# Patient Record
Sex: MALE | Race: BLACK OR AFRICAN AMERICAN | NOT HISPANIC OR LATINO | Employment: UNEMPLOYED | URBAN - METROPOLITAN AREA
[De-identification: names, ages, dates, MRNs, and addresses within clinical notes are randomized per-mention and may not be internally consistent; named-entity substitution may affect disease eponyms.]

---

## 2023-12-18 ENCOUNTER — HOSPITAL ENCOUNTER (EMERGENCY)
Facility: HOSPITAL | Age: 43
Discharge: HOME | End: 2023-12-18
Payer: MEDICAID

## 2023-12-18 VITALS
DIASTOLIC BLOOD PRESSURE: 75 MMHG | WEIGHT: 185 LBS | OXYGEN SATURATION: 100 % | SYSTOLIC BLOOD PRESSURE: 129 MMHG | RESPIRATION RATE: 16 BRPM | BODY MASS INDEX: 25.9 KG/M2 | HEIGHT: 71 IN | HEART RATE: 69 BPM | TEMPERATURE: 96.4 F

## 2023-12-18 DIAGNOSIS — Z71.1 CONCERN ABOUT STD IN MALE WITHOUT DIAGNOSIS: Primary | ICD-10-CM

## 2023-12-18 PROCEDURE — 2500000004 HC RX 250 GENERAL PHARMACY W/ HCPCS (ALT 636 FOR OP/ED): Performed by: PHYSICIAN ASSISTANT

## 2023-12-18 PROCEDURE — 87661 TRICHOMONAS VAGINALIS AMPLIF: CPT | Mod: 59 | Performed by: PHYSICIAN ASSISTANT

## 2023-12-18 PROCEDURE — 99284 EMERGENCY DEPT VISIT MOD MDM: CPT | Performed by: PHYSICIAN ASSISTANT

## 2023-12-18 PROCEDURE — 99283 EMERGENCY DEPT VISIT LOW MDM: CPT | Mod: 25

## 2023-12-18 PROCEDURE — 99284 EMERGENCY DEPT VISIT MOD MDM: CPT

## 2023-12-18 PROCEDURE — 87800 DETECT AGNT MULT DNA DIREC: CPT | Performed by: PHYSICIAN ASSISTANT

## 2023-12-18 PROCEDURE — 96372 THER/PROPH/DIAG INJ SC/IM: CPT

## 2023-12-18 PROCEDURE — 2500000001 HC RX 250 WO HCPCS SELF ADMINISTERED DRUGS (ALT 637 FOR MEDICARE OP): Performed by: PHYSICIAN ASSISTANT

## 2023-12-18 RX ORDER — METRONIDAZOLE 500 MG/1
2000 TABLET ORAL ONCE
Status: COMPLETED | OUTPATIENT
Start: 2023-12-18 | End: 2023-12-18

## 2023-12-18 RX ORDER — DOXYCYCLINE 100 MG/1
100 TABLET ORAL 2 TIMES DAILY
Qty: 14 TABLET | Refills: 0 | Status: SHIPPED | OUTPATIENT
Start: 2023-12-18 | End: 2023-12-25

## 2023-12-18 RX ORDER — DOXYCYCLINE HYCLATE 100 MG
100 TABLET ORAL ONCE
Status: COMPLETED | OUTPATIENT
Start: 2023-12-18 | End: 2023-12-18

## 2023-12-18 RX ORDER — CEFTRIAXONE 500 MG/1
500 INJECTION, POWDER, FOR SOLUTION INTRAMUSCULAR; INTRAVENOUS ONCE
Status: COMPLETED | OUTPATIENT
Start: 2023-12-18 | End: 2023-12-18

## 2023-12-18 RX ADMIN — CEFTRIAXONE SODIUM 500 MG: 500 INJECTION, POWDER, FOR SOLUTION INTRAMUSCULAR; INTRAVENOUS at 13:47

## 2023-12-18 RX ADMIN — METRONIDAZOLE 2000 MG: 500 TABLET ORAL at 13:47

## 2023-12-18 RX ADMIN — DOXYCYCLINE HYCLATE 100 MG: 100 TABLET, COATED ORAL at 13:47

## 2023-12-18 ASSESSMENT — LIFESTYLE VARIABLES
EVER HAD A DRINK FIRST THING IN THE MORNING TO STEADY YOUR NERVES TO GET RID OF A HANGOVER: NO
HAVE PEOPLE ANNOYED YOU BY CRITICIZING YOUR DRINKING: NO
EVER FELT BAD OR GUILTY ABOUT YOUR DRINKING: NO
REASON UNABLE TO ASSESS: NO
HAVE YOU EVER FELT YOU SHOULD CUT DOWN ON YOUR DRINKING: NO

## 2023-12-18 ASSESSMENT — COLUMBIA-SUICIDE SEVERITY RATING SCALE - C-SSRS
1. IN THE PAST MONTH, HAVE YOU WISHED YOU WERE DEAD OR WISHED YOU COULD GO TO SLEEP AND NOT WAKE UP?: NO
6. HAVE YOU EVER DONE ANYTHING, STARTED TO DO ANYTHING, OR PREPARED TO DO ANYTHING TO END YOUR LIFE?: NO
2. HAVE YOU ACTUALLY HAD ANY THOUGHTS OF KILLING YOURSELF?: NO

## 2023-12-18 ASSESSMENT — PAIN - FUNCTIONAL ASSESSMENT: PAIN_FUNCTIONAL_ASSESSMENT: 0-10

## 2023-12-18 NOTE — DISCHARGE INSTRUCTIONS
Take the full course of the antibiotics.  You will be called for any positive results.  In that case you need to tell your partners to be tested and treated.  If you have persistent symptoms follow-up with your PCP, if you need a new 1 call the number listed below.

## 2023-12-18 NOTE — ED PROVIDER NOTES
HPI:  43-year-old male otherwise healthy presents with concerns for STDs.  States he has some penile discharge.  Is sexually active without protection.  Denies any lesions.  Denies any scrotal pain.  Denies any dysuria.  He would like to be treated empirically.  Does not want any blood testing.      Physical Exam:   GEN: Vitals noted. NAD  EYES:  EOMs grossly intact, anicteric sclera  ROSELINE: Mucosa moist.  NECK: Supple.  CARD: RRR  PULMONARY: Moving air well. Clear all lung fields.  ABDOMEN: Soft, no guarding, no rigidity. Nontender. NABS  EXTREMITIES: Full ROM, no pitting edema,   SKIN: Intact, warm and dry  NEURO: Alert and oriented x 3, speech is clear, no obvious deficits noted.   : Declined exam    ----------------------------------------------------------------------------------------------------------------------------    MDM:  43-year-old male presenting for concerns for STDs.  On exam he is well-appearing in bed comfortably.  He declined  exam which I feel is reasonable given lack of scrotal pain and lack of reported lesions.  Will treat him empirically per his request for GC/chlamydia and trichomonas.  First dose given in ED.  1 week of doxycycline provided.  Told to follow-up with PCP.  Tell his partner for any positive results that he will receive a phone call for.    No orders to display     Labs Reviewed   C. TRACHOMATIS + N. GONORRHOEAE, AMPLIFIED   TRICH VAGINALIS, AMPLIFIED     Diagnoses as of 12/18/23 1342   Concern about STD in male without diagnosis     ----------------------------------------------------------------------------------------------------------------------------    This note was dictated using a speech recognition program.  While an attempt was made at proof reading to minimize errors, minor errors in transcription may be present call for questions.     Evert Ma PA-C  12/18/23 [id number]

## 2023-12-19 LAB
C TRACH RRNA SPEC QL NAA+PROBE: POSITIVE
N GONORRHOEA DNA SPEC QL PROBE+SIG AMP: NEGATIVE
T VAGINALIS RRNA SPEC QL NAA+PROBE: NEGATIVE

## 2023-12-21 ENCOUNTER — TELEPHONE (OUTPATIENT)
Dept: PHARMACY | Facility: HOSPITAL | Age: 43
End: 2023-12-21
Payer: MEDICAID

## 2023-12-21 NOTE — PROGRESS NOTES
EDPD Note: Antibiotics Reviewed and Warranted    Contacted . Josh Marie regarding a positive chlamydia result that was taken during their recent emergency room visit. I completed education with patient . The patient is being treated appropriately with doxycyline 100 mg BID x 7 days.      Latest Reference Range & Units 12/18/23 13:38   Chlamydia trachomatis, Amplified Negative  Positive !   Neisseria gonorrhea,Amplified Negative  Negative   Trichomonas Vaginalis Negative, Invalid, TRICH neg  Negative   !: Data is abnormal     Patient presented to the ED with concern for STI. Given empiric treatment with metronidazole 2 g x 1 and ceftriaxone 500 mg IM x 1. Patient discharged with paper prescription for doxycyline. Patient will take to get filled today.     No further follow up needed from EDPD Team.     Aspen Juarez, PharmD

## 2024-07-22 ENCOUNTER — HOSPITAL ENCOUNTER (EMERGENCY)
Facility: HOSPITAL | Age: 44
Discharge: HOME | End: 2024-07-22
Attending: STUDENT IN AN ORGANIZED HEALTH CARE EDUCATION/TRAINING PROGRAM
Payer: MEDICAID

## 2024-07-22 VITALS
HEART RATE: 77 BPM | HEIGHT: 71 IN | RESPIRATION RATE: 15 BRPM | WEIGHT: 180 LBS | OXYGEN SATURATION: 98 % | BODY MASS INDEX: 25.2 KG/M2 | TEMPERATURE: 97.4 F | SYSTOLIC BLOOD PRESSURE: 128 MMHG | DIASTOLIC BLOOD PRESSURE: 77 MMHG

## 2024-07-22 DIAGNOSIS — K08.89 PAIN, DENTAL: Primary | ICD-10-CM

## 2024-07-22 PROCEDURE — 99283 EMERGENCY DEPT VISIT LOW MDM: CPT

## 2024-07-22 PROCEDURE — 99283 EMERGENCY DEPT VISIT LOW MDM: CPT | Performed by: STUDENT IN AN ORGANIZED HEALTH CARE EDUCATION/TRAINING PROGRAM

## 2024-07-22 RX ORDER — CHLORHEXIDINE GLUCONATE ORAL RINSE 1.2 MG/ML
15 SOLUTION DENTAL AS NEEDED
Qty: 120 ML | Refills: 0 | Status: SHIPPED | OUTPATIENT
Start: 2024-07-22 | End: 2024-08-05

## 2024-07-22 NOTE — DISCHARGE INSTRUCTIONS
You presented to the ED for tooth tingling.  You are noted to have visual plaque and poor dentition.  Follow-up with dentistry within the next 2 to 3 days.  Dental clinic phone number is [phone].  Please return to the emergency department for any new or worsening symptoms including but not limited to mild swelling and concerning pain.

## 2024-07-22 NOTE — ED TRIAGE NOTES
Gum sensitivity started 2-3 days ago, no difficulty swallowing, no bleeding to patients knowledge.

## 2024-07-22 NOTE — ED PROVIDER NOTES
"CC: Dental Pain     HPI:  Patient is a 44-year-old male no pertinent past medical history presents to the ED for dental discomfort.  Patient has noted dental discomfort around his gums over the past 2 days.  Notes dental discomfort around his front 4 teeth.  He notes that he has brushes teeth and use mouthwash without any improvement.  Patient notes that he brushes his teeth 2 times a day.  Patient notes that he has not seen a dentist in \"some time \".  He notes a little bit of blood from his gums.  Patient also notes that he does not floss.  Patient notes that he has had gum tingling sensation for that improved with Peridex wash.  Denied history of dental surgery.  Denied fevers, chills, difficulty breathing, trauma, falls, headache, chest pain, neck pain, back pain, and abdominal pain.    Limitations to history: None  Independent historian(s): Patient  Records Reviewed: Recent available ED and inpatient notes reviewed in EMR.    PMHx/PSHx:  Per HPI.   - has no past medical history on file.  - has no past surgical history on file.    Medications:  Reviewed in EMR. See EMR for complete list of medications and doses.    Allergies:  Patient has no known allergies.    Social History:  - Tobacco:  has no history on file for tobacco use.   - Alcohol:  has no history on file for alcohol use.   - Illicit Drugs:  has no history on file for drug use.     ROS:  Per HPI.       ???????????????????????????????????????????????????????????????  Triage Vitals:  T 36.5 °C (97.7 °F)  HR 98  /78  RR 16  O2 (!) 93 %      Physical Exam  Vitals and nursing note reviewed.   Constitutional:       General: He is not in acute distress.  HENT:      Head: Normocephalic and atraumatic.      Nose: Nose normal.      Mouth/Throat:      Mouth: Mucous membranes are moist.      Comments: Significant plaque buildup around teeth 23 through 26.  Poor dentition throughout.  Eyes:      Conjunctiva/sclera: Conjunctivae normal.   Cardiovascular:    "   Rate and Rhythm: Normal rate and regular rhythm.      Pulses: Normal pulses.   Pulmonary:      Effort: Pulmonary effort is normal. No respiratory distress.      Breath sounds: Normal breath sounds.   Abdominal:      Palpations: Abdomen is soft.      Tenderness: There is no abdominal tenderness.   Skin:     General: Skin is warm.   Neurological:      General: No focal deficit present.      Mental Status: He is alert.         ???????????????????????????????????????????????????????????????  Labs:   Labs Reviewed - No data to display     Imaging:   No orders to display       MDM:  Patient is a 44-year-old male no pertinent past medical history presents to the ED for dental discomfort. Patient is HDS. Physical exam findings significant for gingival plaque surrounding teeth 23 through 26 and poor dentition throughout.  No bleeding noted in the mouth.  Low clinical concern for RPA, PTA, Estevan angina, respiratory compromise, pneumonia, necrotic infection, dental abscess, traumatic process, hemorrhage, and vascular process.  Bleeding from the patient's mouth likely secondary to poor dentition.  Patient noted to have likely gingival plaques of teeth 23 through 26 and poor dentition throughout concerning for intermittent caries.  Patient prescribed Peridex wash.  Patient advised to floss daily.  Patient received information for dental clinic and ordered referral.  Discussed ED findings, plan for dental follow-up within the next 2 to 3 days, and strict return precautions for any new or worsening symptoms including but limited to oral swelling and concerning pain.  Patient stated understanding and agreement with the plan.  All questions were answered.  Patient discharged in stable condition.    ED Course:  Diagnoses as of 07/22/24 1619   Pain, dental       Social Determinants Limiting Care:  None identified    Disposition:  Discharge    Lino Cotto MD   Emergency Medicine PGY-3  [facility]  Center    Comment: Please note this report has been produced using speech recognition software and may contain errors related to that system including errors in grammar, punctuation, and spelling as well as words and phrases that may be inappropriate.  If there are any questions or concerns please feel free to contact the dictating provider for clarification.    Procedures ? Smart[employer] last updated 7/22/2024 4:15 PM        Lino Cotto MD  Resident  07/22/24 [id number]

## 2024-07-30 ENCOUNTER — HOSPITAL ENCOUNTER (EMERGENCY)
Facility: HOSPITAL | Age: 44
Discharge: HOME | End: 2024-07-30
Payer: MEDICAID

## 2024-07-30 VITALS
WEIGHT: 170 LBS | SYSTOLIC BLOOD PRESSURE: 122 MMHG | RESPIRATION RATE: 16 BRPM | TEMPERATURE: 97.9 F | HEART RATE: 87 BPM | BODY MASS INDEX: 23.03 KG/M2 | DIASTOLIC BLOOD PRESSURE: 72 MMHG | OXYGEN SATURATION: 96 % | HEIGHT: 72 IN

## 2024-07-30 DIAGNOSIS — Z20.2 ENCOUNTER FOR ASSESSMENT OF STD EXPOSURE: Primary | ICD-10-CM

## 2024-07-30 LAB
C TRACH RRNA SPEC QL NAA+PROBE: NEGATIVE
N GONORRHOEA DNA SPEC QL PROBE+SIG AMP: POSITIVE

## 2024-07-30 PROCEDURE — 99283 EMERGENCY DEPT VISIT LOW MDM: CPT

## 2024-07-30 PROCEDURE — 99284 EMERGENCY DEPT VISIT MOD MDM: CPT | Performed by: NURSE PRACTITIONER

## 2024-07-30 PROCEDURE — 96372 THER/PROPH/DIAG INJ SC/IM: CPT | Performed by: NURSE PRACTITIONER

## 2024-07-30 PROCEDURE — 87491 CHLMYD TRACH DNA AMP PROBE: CPT | Performed by: NURSE PRACTITIONER

## 2024-07-30 PROCEDURE — 2500000004 HC RX 250 GENERAL PHARMACY W/ HCPCS (ALT 636 FOR OP/ED): Mod: SE | Performed by: NURSE PRACTITIONER

## 2024-07-30 RX ORDER — CEFTRIAXONE 500 MG/1
500 INJECTION, POWDER, FOR SOLUTION INTRAMUSCULAR; INTRAVENOUS ONCE
Status: COMPLETED | OUTPATIENT
Start: 2024-07-30 | End: 2024-07-30

## 2024-07-30 ASSESSMENT — PAIN - FUNCTIONAL ASSESSMENT: PAIN_FUNCTIONAL_ASSESSMENT: 0-10

## 2024-07-30 ASSESSMENT — COLUMBIA-SUICIDE SEVERITY RATING SCALE - C-SSRS
6. HAVE YOU EVER DONE ANYTHING, STARTED TO DO ANYTHING, OR PREPARED TO DO ANYTHING TO END YOUR LIFE?: NO
2. HAVE YOU ACTUALLY HAD ANY THOUGHTS OF KILLING YOURSELF?: NO
1. IN THE PAST MONTH, HAVE YOU WISHED YOU WERE DEAD OR WISHED YOU COULD GO TO SLEEP AND NOT WAKE UP?: NO

## 2024-07-30 ASSESSMENT — PAIN SCALES - GENERAL: PAINLEVEL_OUTOF10: 3

## 2024-07-30 NOTE — Clinical Note
Josh Marie was seen and treated in our emergency department on 7/30/2024.  He may return to work on 07/31/2024.       If you have any questions or concerns, please don't hesitate to call.      Cristiana Mays, APRN-CNP

## 2024-07-30 NOTE — ED PROVIDER NOTES
HPI   Chief Complaint   Patient presents with    Exposure to STD         History provided by:  Patient   used: No        44-year-old male presents for exposure to gonorrhea/STD testing and treatment.  Patient stated unprotected sexual intercourse approximately 1 week ago and over the past couple days has been having green penile discharge.  He states that he has had gonorrhea in the past and this is the same.  He is taken no medication at home for his symptoms.  He denies any fevers, chills, abdominal pain, back pain, rash, scrotal pain or any additional symptoms or complaints this time.    ROS is otherwise negative unless stated above.  Patient History   History reviewed. No pertinent past medical history.  History reviewed. No pertinent surgical history.  No family history on file.  Social History     Tobacco Use    Smoking status: Not on file    Smokeless tobacco: Not on file   Substance Use Topics    Alcohol use: Not on file    Drug use: Not on file       Physical Exam   ED Triage Vitals [07/30/24 1029]   Temperature Heart Rate Respirations BP   36.6 °C (97.9 °F) 87 16 122/72      Pulse Ox Temp src Heart Rate Source Patient Position   96 % -- -- --      BP Location FiO2 (%)     -- --       Physical Exam  VS: As documented in the triage note and EMR flowsheet from this visit were reviewed.    GEN: NAD, nontoxic, well-appearing, ambulates without difficulty  ABDOMEN: Abdomen soft, non-distended, no rebound, no guarding. Bowel sounds normal in all 4 quadrants. No tenderness to palpation.  No CVA tenderness.  No masses or organomegaly noted.  No evidence of peritonitis.   : deferred  SKIN: Skin normal color for race, warm, dry and intact. No evidence of trauma. No rash noted.  LYMPH: No adenopathy or splenomegaly. No cervical, supraclavicular or inguinal lymphadenopathy.    ED Course & MDM   Diagnoses as of 07/30/24 1121   Encounter for assessment of STD exposure                       [place]  "Coma Scale Score: 15                        Medical Decision Making  upon assessment patient was a healthy non-toxic appearing male in no apparent distress.  he was ambulating independently in the ED without difficulty.  Refused penile exam. No abdominal tenderness to palpation. No rash noted otherwise. Assessment benign. See physical exam section for my assessment.  Physical exam concerning for but not limited to STD.  Due to prior history and current physical exam, I ordered urine for STDs and HIV/syphilis/hep C blood draws.  These did not result while he was in the emergency department.  He was initially agreeable to the HIV/syphilis and hep C blood draws but then became argumentative with myself and stated he was not getting his blood drawn therefore these were discontinued.  He states that he just had his labs drawn for \"these things\" and does not want to repeat at this time.  Patient was educated that he would receive a call in 2-3 days if any of his results came back positive and he needs treatment for chlamydia .  Patient was offered/ requested prophylactic STD treatment for gonorrhea while in the emergency department.  Patient remained hemodynamically stable throughout ED visit.  Previous consult/ED visit notes were reviewed. Findings were discussed with patient and patient agreeable for plan to discharge home with primary care provider follow up in 1 week and to continue Tylenol by mouth and ibuprofen by mouth at home as needed for pain. he was educated to refrain from sex at this time and until symptoms resolve.  he was educated on safe sex practices.  Return precautions discussed and patient acknowledged understanding.  All questions and concerns answered prior to discharge.               *Please note that portions of this note may have been completed with a voice recognition program.  Efforts were made to edit the dictations but occasionally, words are mis-transcribed.    Procedure  Procedures   "   Cristiana Mays, APRN-CNP  07/30/24 1125